# Patient Record
Sex: MALE | Race: WHITE
[De-identification: names, ages, dates, MRNs, and addresses within clinical notes are randomized per-mention and may not be internally consistent; named-entity substitution may affect disease eponyms.]

---

## 2020-12-16 ENCOUNTER — APPOINTMENT (OUTPATIENT)
Dept: CT IMAGING | Facility: CLINIC | Age: 55
End: 2020-12-16
Payer: MEDICARE

## 2020-12-16 PROBLEM — Z00.00 ENCOUNTER FOR PREVENTIVE HEALTH EXAMINATION: Status: ACTIVE | Noted: 2020-12-16

## 2020-12-16 PROCEDURE — Q9967B: CUSTOM

## 2020-12-16 PROCEDURE — 82565A: CUSTOM | Mod: QW

## 2020-12-16 PROCEDURE — 74177 CT ABD & PELVIS W/CONTRAST: CPT | Mod: MH

## 2024-01-26 ENCOUNTER — APPOINTMENT (OUTPATIENT)
Dept: ENDOCRINOLOGY | Facility: CLINIC | Age: 59
End: 2024-01-26

## 2024-02-20 ENCOUNTER — APPOINTMENT (OUTPATIENT)
Dept: ENDOCRINOLOGY | Facility: CLINIC | Age: 59
End: 2024-02-20
Payer: MEDICARE

## 2024-02-20 VITALS
TEMPERATURE: 98.3 F | WEIGHT: 221 LBS | SYSTOLIC BLOOD PRESSURE: 136 MMHG | BODY MASS INDEX: 40.67 KG/M2 | OXYGEN SATURATION: 97 % | HEIGHT: 62 IN | DIASTOLIC BLOOD PRESSURE: 80 MMHG | HEART RATE: 78 BPM

## 2024-02-20 DIAGNOSIS — M10.9 GOUT, UNSPECIFIED: ICD-10-CM

## 2024-02-20 DIAGNOSIS — Z83.3 FAMILY HISTORY OF DIABETES MELLITUS: ICD-10-CM

## 2024-02-20 DIAGNOSIS — Z78.9 OTHER SPECIFIED HEALTH STATUS: ICD-10-CM

## 2024-02-20 DIAGNOSIS — J45.909 UNSPECIFIED ASTHMA, UNCOMPLICATED: ICD-10-CM

## 2024-02-20 DIAGNOSIS — Z83.438 FAMILY HISTORY OF OTHER DISORDER OF LIPOPROTEIN METABOLISM AND OTHER LIPIDEMIA: ICD-10-CM

## 2024-02-20 DIAGNOSIS — E13.69 OTHER SPECIFIED DIABETES MELLITUS WITH OTHER SPECIFIED COMPLICATION: ICD-10-CM

## 2024-02-20 PROCEDURE — 99204 OFFICE O/P NEW MOD 45 MIN: CPT

## 2024-02-20 RX ORDER — BUDESONIDE AND FORMOTEROL FUMARATE DIHYDRATE 160; 4.5 UG/1; UG/1
160-4.5 AEROSOL RESPIRATORY (INHALATION) TWICE DAILY
Qty: 3 | Refills: 0 | Status: ACTIVE | COMMUNITY

## 2024-02-20 RX ORDER — BLOOD SUGAR DIAGNOSTIC
KIT MISCELLANEOUS
Refills: 0 | Status: ACTIVE | COMMUNITY

## 2024-02-20 RX ORDER — ALBUTEROL SULFATE 2.5 MG/3ML
(2.5 MG/3ML) SOLUTION RESPIRATORY (INHALATION)
Qty: 1 | Refills: 2 | Status: ACTIVE | COMMUNITY

## 2024-02-20 RX ORDER — LANCETS 33 GAUGE
EACH MISCELLANEOUS
Refills: 0 | Status: ACTIVE | COMMUNITY

## 2024-02-20 RX ORDER — MONTELUKAST SODIUM 10 MG/1
10 TABLET, FILM COATED ORAL DAILY
Refills: 0 | Status: ACTIVE | COMMUNITY

## 2024-02-20 RX ORDER — LANCETS
EACH MISCELLANEOUS
Refills: 0 | Status: ACTIVE | COMMUNITY

## 2024-02-20 RX ORDER — FLUTICASONE FUROATE, UMECLIDINIUM BROMIDE AND VILANTEROL TRIFENATATE 200; 62.5; 25 UG/1; UG/1; UG/1
POWDER RESPIRATORY (INHALATION)
Refills: 0 | Status: ACTIVE | COMMUNITY

## 2024-02-20 RX ORDER — METFORMIN HYDROCHLORIDE 500 MG/1
500 TABLET, COATED ORAL TWICE DAILY
Qty: 180 | Refills: 3 | Status: ACTIVE | COMMUNITY

## 2024-02-20 RX ORDER — ALLOPURINOL 100 MG/1
100 TABLET ORAL DAILY
Refills: 0 | Status: ACTIVE | COMMUNITY

## 2024-02-20 RX ORDER — VALSARTAN 320 MG/1
320 TABLET, COATED ORAL DAILY
Refills: 0 | Status: ACTIVE | COMMUNITY

## 2024-02-20 RX ORDER — GLIMEPIRIDE 1 MG/1
1 TABLET ORAL DAILY
Refills: 0 | Status: ACTIVE | COMMUNITY

## 2024-02-20 RX ORDER — ATORVASTATIN CALCIUM 20 MG/1
20 TABLET, FILM COATED ORAL DAILY
Refills: 0 | Status: ACTIVE | COMMUNITY

## 2024-02-20 RX ORDER — CHROMIUM 200 MCG
TABLET ORAL
Refills: 0 | Status: ACTIVE | COMMUNITY

## 2024-02-20 RX ORDER — METFORMIN HYDROCHLORIDE 1000 MG/1
1000 TABLET, COATED ORAL
Qty: 180 | Refills: 1 | Status: ACTIVE | COMMUNITY
Start: 2024-02-20 | End: 1900-01-01

## 2024-02-20 RX ORDER — ICOSAPENT ETHYL 1000 MG/1
1 CAPSULE ORAL
Refills: 0 | Status: ACTIVE | COMMUNITY

## 2024-02-20 RX ORDER — FLUTICASONE PROPIONATE 50 MCG
50 SPRAY, SUSPENSION NASAL DAILY
Refills: 0 | Status: ACTIVE | COMMUNITY

## 2024-02-20 NOTE — HISTORY OF PRESENT ILLNESS
[FreeTextEntry1] : 58 year old man with h/o T2DM, obesity (BMI 40), HTN, HLD, gout presented to [facility] care. He is accompanied with his wife who is supporting pt for diabetes and weight control. She is also trying to lose weight.   Labs  Oct 2023- A1C 8.7% Jan 2024- A1C 7.9%, eGFR 62, TSH 1.7, Vit B12 407  Diagnosed with T2DM about 1-2 years ago. He was pre-DM prior to that for few years.  Reports having needle phobia.   No excessive alcohol consumption.  Diet high in Bread, pasta, pizza ... Italian diet. Drinks tea and honey. Drinks soda.  Skips breakfast.   Doesn't check FBG. Had CGM but didn't use it due to being scared of needle.   Reports difficulty with diet, especially during holidays. He was on rybelsus for 6weeks w/o SE but developed N/V during holiday after eating larger portions.  Feels he needs to eat all the food on his plate or the fast food he orders.   He follows up with ophthalmology- reports   No peripheral neuropathy.  GM and aunt had DM

## 2024-02-20 NOTE — PHYSICAL EXAM
[Obese] : obese [No Respiratory Distress] : no respiratory distress [Normal Insight/Judgement] : insight and judgment were intact

## 2024-02-20 NOTE — ASSESSMENT
[FreeTextEntry1] :  1- Poorly controlled DM  He was on rybelsus for 6 weeks w/o SE, during holidays developed N/V.   Plan:  - Increase metformin to 1000 mg BID. c/w glimepiride 1 mg daily.   - CDE referral. Interested to have a better glycemic management via diet. Needs glucometer and CGM.  - Lab in 1-2 weeks before next follow up appointment in 2 month. May consider another trial of GLP-1. May consider substituting glimepiride with Jardiance.  - Regular F/U with ophthalmology to monitor DR  2- Obesity BMI 40  Difficulty with craving and portion control   3- HLP Atorvastatin 20 mg  - Will check lipid profile in 2-3 month

## 2024-02-21 LAB
GLUCOSE BLDC GLUCOMTR-MCNC: 150
HBA1C MFR BLD HPLC: 7.9
HBA1C MFR BLD HPLC: 8.7

## 2024-03-06 ENCOUNTER — APPOINTMENT (OUTPATIENT)
Dept: ENDOCRINOLOGY | Facility: CLINIC | Age: 59
End: 2024-03-06
Payer: MEDICARE

## 2024-03-06 PROCEDURE — 97802 MEDICAL NUTRITION INDIV IN: CPT

## 2024-03-26 RX ORDER — FLASH GLUCOSE SENSOR
KIT MISCELLANEOUS
Qty: 2 | Refills: 5 | Status: ACTIVE | COMMUNITY
Start: 2024-03-22 | End: 1900-01-01

## 2024-04-16 LAB — HBA1C MFR BLD HPLC: 7.3

## 2024-04-23 ENCOUNTER — APPOINTMENT (OUTPATIENT)
Dept: ENDOCRINOLOGY | Facility: CLINIC | Age: 59
End: 2024-04-23
Payer: MEDICARE

## 2024-04-23 VITALS
BODY MASS INDEX: 39.2 KG/M2 | SYSTOLIC BLOOD PRESSURE: 130 MMHG | OXYGEN SATURATION: 97 % | DIASTOLIC BLOOD PRESSURE: 72 MMHG | HEART RATE: 87 BPM | WEIGHT: 213 LBS | HEIGHT: 62 IN

## 2024-04-23 DIAGNOSIS — E55.9 VITAMIN D DEFICIENCY, UNSPECIFIED: ICD-10-CM

## 2024-04-23 DIAGNOSIS — E11.9 TYPE 2 DIABETES MELLITUS W/OUT COMPLICATIONS: ICD-10-CM

## 2024-04-23 DIAGNOSIS — E66.9 OBESITY, UNSPECIFIED: ICD-10-CM

## 2024-04-23 DIAGNOSIS — E78.5 HYPERLIPIDEMIA, UNSPECIFIED: ICD-10-CM

## 2024-04-23 LAB — GLUCOSE BLDC GLUCOMTR-MCNC: 177

## 2024-04-23 PROCEDURE — 99214 OFFICE O/P EST MOD 30 MIN: CPT

## 2024-04-23 RX ORDER — ATORVASTATIN CALCIUM 40 MG/1
40 TABLET, FILM COATED ORAL
Qty: 90 | Refills: 1 | Status: ACTIVE | COMMUNITY
Start: 2024-04-23 | End: 1900-01-01

## 2024-04-23 NOTE — ASSESSMENT
[FreeTextEntry1] : 1- Poorly controlled DM  He was on rybelsus for 6 weeks w/o SE, during holidays developed N/V.   Plan:  - Recently increase metformin to 1000 mg BID. c/w glimepiride 1 mg daily.   - Has adjusted his diet significantly - c/w silver CGM  - May consider substituting glimepiride with Jardiance.  - Regular F/U with ophthalmology to monitor DR  2- Obesity BMI 40 decreased to 39 - emphasized continuing a health diet   3- HLP with Tg in 400s - increase atorvastatin to 40 mg daily. c/e vascepa 2 gr BID.  - repeat lipid profile in 3 month. May consider adding ezetimibe if LDL-C remain elevated.   4- Vit D deficiency  - c/w vit D 50,000 IU once a week

## 2024-04-23 NOTE — HISTORY OF PRESENT ILLNESS
[FreeTextEntry1] : 58 year old man with h/o T2DM, obesity (BMI 40), HTN, HLD, gout presented for follow up. He is accompanied with his wife who is supporting pt for diabetes and weight control. She is also trying to lose weight.   Labs April 2024- A1C 7.3%, c-peptide 7.8 with ,  eGFR 61, Urine micro alb 9.6, LDL-C 109, Tg 407, TSH 1.8, Vit D 50 , Vit B12  350 Oct 2023- A1C 8.7% Jan 2024- A1C 7.9%, eGFR 62, TSH 1.7, Vit B12 407  Diagnosed with T2DM about 1-2 years ago. He was pre-DM prior to that for few years.  Reports having needle phobia.  Currently on metformin 1000 mg BID (until few days ago he was mistakenly taking metformin 500 mg BID from an old bottle) plus Glimepiride 1 mg daily.    No excessive alcohol consumption.  Used to eat diet high in Bread, pasta, pizza ... Italian diet. Drinks tea and honey. Drinks soda. Significantly improved his diet.  Wears Treva 2 and monitors the effect of the meals on his BG.   CGM downloaded and reviewed: Treva 2 Average glucose: 185 GMI 7.7% % time CGM active: 76% %CV 26.8%  % VERY HIGH (>250): 9% % HIGH (181-250): 41% % TARGET (): 50% % LOW (54-69): 0 % VERY LOW (<54): 0   He follows up with ophthalmology- reports DRGibran  No peripheral neuropathy.   Wife reports he snores during night.   For hyperlipidemia takes Atorvastatin 20 mg daily and Vascepa- until recently he was taking 1 gr BID, recently up titrated to 2 gr BID.   For Vit D deficiency takes 50,000 IU once a week.  GM and aunt had DM

## 2024-05-29 ENCOUNTER — APPOINTMENT (OUTPATIENT)
Dept: DERMATOLOGY | Facility: CLINIC | Age: 59
End: 2024-05-29
Payer: MEDICARE

## 2024-05-29 PROCEDURE — 99203 OFFICE O/P NEW LOW 30 MIN: CPT

## 2024-07-31 ENCOUNTER — RX RENEWAL (OUTPATIENT)
Age: 59
End: 2024-07-31

## 2024-09-17 ENCOUNTER — APPOINTMENT (OUTPATIENT)
Dept: ENDOCRINOLOGY | Facility: CLINIC | Age: 59
End: 2024-09-17
Payer: MEDICARE

## 2024-09-17 VITALS
SYSTOLIC BLOOD PRESSURE: 126 MMHG | WEIGHT: 216 LBS | DIASTOLIC BLOOD PRESSURE: 82 MMHG | HEIGHT: 62 IN | OXYGEN SATURATION: 97 % | TEMPERATURE: 97.8 F | HEART RATE: 80 BPM | BODY MASS INDEX: 39.75 KG/M2

## 2024-09-17 DIAGNOSIS — E11.9 TYPE 2 DIABETES MELLITUS W/OUT COMPLICATIONS: ICD-10-CM

## 2024-09-17 DIAGNOSIS — Z00.00 ENCOUNTER FOR GENERAL ADULT MEDICAL EXAMINATION W/OUT ABNORMAL FINDINGS: ICD-10-CM

## 2024-09-17 DIAGNOSIS — E78.5 HYPERLIPIDEMIA, UNSPECIFIED: ICD-10-CM

## 2024-09-17 LAB — GLUCOSE BLDC GLUCOMTR-MCNC: 132

## 2024-09-17 PROCEDURE — 95251 CONT GLUC MNTR ANALYSIS I&R: CPT

## 2024-09-17 PROCEDURE — 99214 OFFICE O/P EST MOD 30 MIN: CPT

## 2024-09-17 RX ORDER — ASCORBIC ACID 125 MG
3000 TABLET,CHEWABLE ORAL
Qty: 90 | Refills: 1 | Status: ACTIVE | COMMUNITY
Start: 2024-09-17 | End: 1900-01-01

## 2024-09-17 RX ORDER — EMPAGLIFLOZIN 10 MG/1
10 TABLET, FILM COATED ORAL
Qty: 90 | Refills: 0 | Status: ACTIVE | COMMUNITY
Start: 2024-09-17 | End: 1900-01-01

## 2024-09-17 NOTE — HISTORY OF PRESENT ILLNESS
[FreeTextEntry1] : 58 year old man with h/o T2DM, obesity (BMI 40), HTN, HLD, gout presented to \[facility]\"" care. He is accompanied with his wife who is supporting pt for diabetes and weight control. She is also trying to lose weight.   Labs Sep 2024- A1C 8.3%, c-peptide 4.6 with glucose of 175, eGFR 60, ACR 28, LDL 81, Tg 336, TSH 2.1, B12 294, Vit D 46,  April 2024- A1C 7.3%, c-peptide 7.8 with ,  eGFR 61, Urine micro alb 9.6, LDL-C 109, Tg 407, TSH 1.8, Vit D 50 , Vit B12  350 Oct 2023- A1C 8.7% Jan 2024- A1C 7.9%, eGFR 62, TSH 1.7, Vit B12 407  Diagnosed with T2DM about 1-2 years ago. He was pre-DM prior to that for few years. Currently takes metformin 1000 mg BID and Glimepiride 1 mg daily.  Reports having needle phobia.  Reports vomiting during holidays while he was taking Rybelsus after eating large meal portions.  No h/o pancreatitis or cholelithiasis.  No h/o UTI.   No excessive alcohol consumption.  Diet high in Bread, pasta, pizza ... Italian diet. Drinks tea and honey. Drinks soda.  Skips breakfast.   Doesn't check FBGdue to being scared of needle.    CGM downloaded and reviewed: DEXCOM 7 Average glucose: 181 % time CGM active: 67% GMI: 7.6% % VERY HIGH (>250): 7% % HIGH (181-250): 41% % TARGET (): 52% % LOW (54-69): 0 % VERY LOW (<54): 0    He follows up with ophthalmology- reports DRGibran  No peripheral neuropathy.  GM and aunt had DM

## 2024-09-17 NOTE — ASSESSMENT
[FreeTextEntry1] : 1- DM- sub optimally controlled  Plan:  - Start Jardiance 10 mg daily.  -c/w metformin to 1000 mg BID. c/w glimepiride 1 mg daily.   - Start sublingual B12 - Has adjusted his diet significantly - c/w silver CGM  - Regular F/U with ophthalmology to monitor DR  2- Obesity BMI 40 decreased to 39 - emphasized continuing a health diet   3- HLP with Tg in 400s - increase atorvastatin to 40 mg daily. c/w vascepa 2 gr BID.  - will repeat lipid profile. May consider adding ezetimibe if LDL-C remain elevated.   4- Vit D deficiency  - c/w vit D 50,000 IU once a week    Follow up in 3 month

## 2024-12-18 ENCOUNTER — APPOINTMENT (OUTPATIENT)
Dept: ENDOCRINOLOGY | Facility: CLINIC | Age: 59
End: 2024-12-18
Payer: MEDICARE

## 2024-12-18 VITALS
HEIGHT: 62 IN | TEMPERATURE: 97.9 F | BODY MASS INDEX: 39.38 KG/M2 | DIASTOLIC BLOOD PRESSURE: 88 MMHG | WEIGHT: 214 LBS | SYSTOLIC BLOOD PRESSURE: 136 MMHG | OXYGEN SATURATION: 96 % | HEART RATE: 85 BPM

## 2024-12-18 DIAGNOSIS — E78.5 HYPERLIPIDEMIA, UNSPECIFIED: ICD-10-CM

## 2024-12-18 DIAGNOSIS — E11.9 TYPE 2 DIABETES MELLITUS W/OUT COMPLICATIONS: ICD-10-CM

## 2024-12-18 DIAGNOSIS — R73.9 HYPERGLYCEMIA, UNSPECIFIED: ICD-10-CM

## 2024-12-18 DIAGNOSIS — R73.09 OTHER ABNORMAL GLUCOSE: ICD-10-CM

## 2024-12-18 PROCEDURE — 99214 OFFICE O/P EST MOD 30 MIN: CPT

## 2024-12-18 RX ORDER — EMPAGLIFLOZIN 10 MG/1
10 TABLET, FILM COATED ORAL DAILY
Qty: 90 | Refills: 1 | Status: ACTIVE | COMMUNITY
Start: 2024-12-18 | End: 1900-01-01

## 2024-12-23 PROBLEM — R73.09 ELEVATED HEMOGLOBIN A1C: Status: ACTIVE | Noted: 2024-12-23

## 2024-12-23 PROBLEM — R73.9 HYPERGLYCEMIA: Status: ACTIVE | Noted: 2024-12-23

## 2024-12-23 LAB — HBA1C MFR BLD HPLC: 7.9

## 2025-05-28 ENCOUNTER — APPOINTMENT (OUTPATIENT)
Dept: ENDOCRINOLOGY | Facility: CLINIC | Age: 60
End: 2025-05-28
Payer: MEDICARE

## 2025-05-28 VITALS
DIASTOLIC BLOOD PRESSURE: 76 MMHG | SYSTOLIC BLOOD PRESSURE: 132 MMHG | OXYGEN SATURATION: 97 % | BODY MASS INDEX: 37.54 KG/M2 | HEIGHT: 62 IN | WEIGHT: 204 LBS | HEART RATE: 78 BPM

## 2025-05-28 DIAGNOSIS — E11.9 TYPE 2 DIABETES MELLITUS W/OUT COMPLICATIONS: ICD-10-CM

## 2025-05-28 DIAGNOSIS — E55.9 VITAMIN D DEFICIENCY, UNSPECIFIED: ICD-10-CM

## 2025-05-28 DIAGNOSIS — E78.5 HYPERLIPIDEMIA, UNSPECIFIED: ICD-10-CM

## 2025-05-28 LAB — GLUCOSE BLDC GLUCOMTR-MCNC: 162

## 2025-05-28 PROCEDURE — 99214 OFFICE O/P EST MOD 30 MIN: CPT

## 2025-05-28 PROCEDURE — 82962 GLUCOSE BLOOD TEST: CPT

## 2025-05-28 RX ORDER — TIRZEPATIDE 2.5 MG/.5ML
2.5 INJECTION, SOLUTION SUBCUTANEOUS
Qty: 1 | Refills: 2 | Status: ACTIVE | COMMUNITY
Start: 2025-05-28 | End: 1900-01-01

## 2025-05-28 RX ORDER — EMPAGLIFLOZIN 25 MG/1
25 TABLET, FILM COATED ORAL DAILY
Qty: 90 | Refills: 1 | Status: ACTIVE | COMMUNITY
Start: 2025-05-28 | End: 1900-01-01

## 2025-05-28 RX ORDER — METFORMIN HYDROCHLORIDE 1000 MG/1
1000 TABLET, COATED ORAL
Qty: 180 | Refills: 1 | Status: ACTIVE | COMMUNITY
Start: 2025-05-28 | End: 1900-01-01

## 2025-06-02 LAB — HBA1C MFR BLD HPLC: 8.1

## 2025-06-11 ENCOUNTER — RESULT REVIEW (OUTPATIENT)
Age: 60
End: 2025-06-11

## 2025-06-11 ENCOUNTER — APPOINTMENT (OUTPATIENT)
Dept: DERMATOLOGY | Facility: CLINIC | Age: 60
End: 2025-06-11
Payer: MEDICARE

## 2025-06-11 PROCEDURE — 99213 OFFICE O/P EST LOW 20 MIN: CPT | Mod: 25

## 2025-06-11 PROCEDURE — 11103 TANGNTL BX SKIN EA SEP/ADDL: CPT

## 2025-06-11 PROCEDURE — 11102 TANGNTL BX SKIN SINGLE LES: CPT

## 2025-06-27 ENCOUNTER — NON-APPOINTMENT (OUTPATIENT)
Age: 60
End: 2025-06-27

## 2025-07-07 ENCOUNTER — APPOINTMENT (OUTPATIENT)
Dept: DERMATOLOGY | Facility: CLINIC | Age: 60
End: 2025-07-07

## 2025-07-28 ENCOUNTER — RX RENEWAL (OUTPATIENT)
Age: 60
End: 2025-07-28

## 2025-09-08 LAB
HBA1C MFR BLD HPLC: 7.2
TSH SERPL-ACNC: 1.33

## 2025-09-10 ENCOUNTER — APPOINTMENT (OUTPATIENT)
Dept: ENDOCRINOLOGY | Facility: CLINIC | Age: 60
End: 2025-09-10